# Patient Record
Sex: FEMALE | Race: WHITE | HISPANIC OR LATINO | Employment: UNEMPLOYED | URBAN - METROPOLITAN AREA
[De-identification: names, ages, dates, MRNs, and addresses within clinical notes are randomized per-mention and may not be internally consistent; named-entity substitution may affect disease eponyms.]

---

## 2020-04-08 ENCOUNTER — LAB VISIT (OUTPATIENT)
Dept: URGENT CARE | Facility: CLINIC | Age: 55
End: 2020-04-08
Payer: OTHER GOVERNMENT

## 2020-04-08 ENCOUNTER — OFFICE VISIT (OUTPATIENT)
Dept: URGENT CARE | Facility: CLINIC | Age: 55
End: 2020-04-08
Payer: OTHER GOVERNMENT

## 2020-04-08 VITALS
DIASTOLIC BLOOD PRESSURE: 78 MMHG | HEART RATE: 75 BPM | BODY MASS INDEX: 33.66 KG/M2 | OXYGEN SATURATION: 97 % | HEIGHT: 63 IN | RESPIRATION RATE: 16 BRPM | WEIGHT: 190 LBS | SYSTOLIC BLOOD PRESSURE: 118 MMHG | TEMPERATURE: 98 F

## 2020-04-08 DIAGNOSIS — J18.9 PNEUMONIA OF LEFT LOWER LOBE DUE TO INFECTIOUS ORGANISM: ICD-10-CM

## 2020-04-08 DIAGNOSIS — J18.9 PNEUMONIA OF LEFT LOWER LOBE DUE TO INFECTIOUS ORGANISM: Primary | ICD-10-CM

## 2020-04-08 DIAGNOSIS — R06.02 SOB (SHORTNESS OF BREATH): ICD-10-CM

## 2020-04-08 DIAGNOSIS — F41.9 ANXIETY: ICD-10-CM

## 2020-04-08 DIAGNOSIS — Z20.822 SUSPECTED COVID-19 VIRUS INFECTION: ICD-10-CM

## 2020-04-08 DIAGNOSIS — R00.2 PALPITATIONS: ICD-10-CM

## 2020-04-08 LAB — SARS-COV-2 RNA RESP QL NAA+PROBE: NOT DETECTED

## 2020-04-08 PROCEDURE — 93010 EKG 12-LEAD: ICD-10-PCS | Mod: S$PBB,TIER,, | Performed by: INTERNAL MEDICINE

## 2020-04-08 PROCEDURE — 71046 X-RAY EXAM CHEST 2 VIEWS: CPT | Mod: FY,TIER,S$GLB, | Performed by: RADIOLOGY

## 2020-04-08 PROCEDURE — 93005 EKG 12-LEAD: ICD-10-PCS | Mod: TIER,S$GLB,, | Performed by: PHYSICIAN ASSISTANT

## 2020-04-08 PROCEDURE — 93005 ELECTROCARDIOGRAM TRACING: CPT | Mod: TIER,S$GLB,, | Performed by: PHYSICIAN ASSISTANT

## 2020-04-08 PROCEDURE — U0002 COVID-19 LAB TEST NON-CDC: HCPCS

## 2020-04-08 PROCEDURE — 99204 PR OFFICE/OUTPT VISIT, NEW, LEVL IV, 45-59 MIN: ICD-10-PCS | Mod: TIER,S$GLB,, | Performed by: PHYSICIAN ASSISTANT

## 2020-04-08 PROCEDURE — 71046 XR CHEST PA AND LATERAL: ICD-10-PCS | Mod: FY,TIER,S$GLB, | Performed by: RADIOLOGY

## 2020-04-08 PROCEDURE — 93010 ELECTROCARDIOGRAM REPORT: CPT | Mod: S$PBB,TIER,, | Performed by: INTERNAL MEDICINE

## 2020-04-08 PROCEDURE — 99204 OFFICE O/P NEW MOD 45 MIN: CPT | Mod: TIER,S$GLB,, | Performed by: PHYSICIAN ASSISTANT

## 2020-04-08 RX ORDER — LISINOPRIL 10 MG/1
10 TABLET ORAL DAILY
COMMUNITY

## 2020-04-08 RX ORDER — ALBUTEROL SULFATE 90 UG/1
2 AEROSOL, METERED RESPIRATORY (INHALATION) EVERY 6 HOURS PRN
Qty: 18 G | Refills: 0 | Status: SHIPPED | OUTPATIENT
Start: 2020-04-08

## 2020-04-08 RX ORDER — AZITHROMYCIN 250 MG/1
TABLET, FILM COATED ORAL
Qty: 6 TABLET | Refills: 0 | Status: SHIPPED | OUTPATIENT
Start: 2020-04-08

## 2020-04-08 RX ORDER — HYDROXYZINE PAMOATE 25 MG/1
25 CAPSULE ORAL 4 TIMES DAILY
Qty: 30 CAPSULE | Refills: 0 | Status: SHIPPED | OUTPATIENT
Start: 2020-04-08

## 2020-04-08 NOTE — PATIENT INSTRUCTIONS
Reacción Ante El Estrés [Stress Reaction]  La ansiedad (anxiety) es tracy sensación que todos tenemos cuando creemos que algo holly puede llegar a pasar. Es tracy respuesta normal ante el estrés y, por lo general, sólo causa tracy reacción leve. Cuando la ansiedad se vuelve más severa, es posible que las emociones interfieran con la kia diaria. En algunos casos, usted quizás no se dé cuenta de qué es lo que le provoca la ansiedad.  Aydin tracy reacción de ansiedad, puede sentirse desesperanzado, nervioso, deprimido o irritable. Shaw cuerpo puede mostrar signos de ansiedad de muchas maneras diferentes. Puede sentir la boca seca, temblores, mareos, debilidad, dificultad para respirar, presión en el pecho, dolor de marck, náuseas, diarrea, cansancio, problemas sexuales o para dormir.  Cuidados En [place]:  1) Intente identificar qué cosas de shaw kia le ocasionan estrés (stress). Quizás no arabella obvias. Pueden ser, por ejemplo:  -- Complicaciones diarias que se van acumulando (embotellamientos, citas a las que no pudo asistir, problemas con el automóvil, etc.).  -- Cambios de kia importantes, tanto buenos (la llegada de un bebé, un ascenso en el trabajo) kevin malos (quedarse sin trabajo, perder a un ser querido).  -- Sobrecarga: la sensación de que usted tiene demasiadas responsabilidades y que no puede ocuparse de todas ellas al mismo tiempo.  -- Sentirse abrumado, la sensación de que elyse problemas son mucho más de lo que usted puede llegar a resolver.  2) Observe cómo responde hsaw cuerpo al estrés. Aprenda a oír las señales que le da shaw cuerpo. Ello puede ayudarle a actuar antes de que el estrés se vuelva grave.  3) Cuando le sea posible, intente hacer algo para eliminar la causa que le origina estrés. (Evite las complicaciones, limite la cantidad de cambios que suceden en shaw kia al mismo tiempo y tómese un descanso cuando se sienta abrumado.)  4) Desafortunadamente, hay muchas situaciones estresantes que no se pueden  "evitar. Es necesario que aprenda a MANEJAR MEJOR EL ESTRÉS. Hay varios métodos comprobados que le permitirán reducir la ansiedad. Por ejemplo, cosas simples kevin hacer ejercicio, tener tracy buena nutrición y el descanso adecuado. Asimismo, hay ciertas técnicas que resultan útiles: ejercicios de relajación y respiración, visualización, biofeedback y meditación. Para obtener más información sobre ello, consulte a shaw médico o vaya a la librería de shaw nelida y ngoc los diferentes libros y cintas que hay disponibles sobre ines shobha.  Visita De Control:  Si gerson que no logra reducir shaw ansiedad con medidas de autoayuda, comuníquese con shaw médico o programe tracy andi con un consejero.  Busque Prontamente Atención Médica  si algo de lo siguiente ocurre:  -- Los síntomas empeoran.  -- Dolor en el pecho o dificultades para respirar.  -- Dolor de marck mikael que no se gabbie descansando y tomando algún calmante suave.  -- El corazón late rápido o de manera irregular. Desmayo.  Date Last Reviewed: 9/29/2015  © 2077-7881 [employer]. [address] [place] [address]. Todos los derechos reservados. Esta información no pretende sustituir la atención médica profesional. Sólo shaw médico puede diagnosticar y tratar un problema de sandra.        Palpitaciones Cardíacas [Heart Palpitations]    Las palpitaciones (palpitations) se refieren a la sensación de que el corazón late con rapidez, mikael o de manera irregular. Algunas personas dicen que sienten que "se les va a salir el corazón" o que "el corazón parece saltarles en el pecho" ("pounding", "skipped beats"). Las palpitaciones pueden presentarse en personas con alguna enfermedad del corazón, shandra también pueden darse en personas sanas.  Causas Relacionadas Con El Corazón:   · Arritmia (arrhythmia): un cambio en el ritmo normal del corazón.  · Enfermedad de las válvulas del corazón.  Causas No Relacionadas Con El Corazón:   · Ciertos medicamentos (tales kevin los " inhaladores para tratar el asma [asthma inhalers] y los descongestionantes [decongestants]).  · Algunos suplementos herbáceos, pastillas y bebidas energizantes, y pastillas para adelgazar.  · Las drogas ilegales estimulantes (tales kevin la cocaína [cocaine], el crack, las metanfetaminas [methamphetamine], el PCP).  · La cafeína, el alcohol y el tabaco.  · Algunas afecciones médicas, tales kevin enfermedad de la glándula tiroides (thyroid disease), anemia, ataques de pánico y ansiedad (anxiety/panic disorder).  En ocasiones, no se logra encontrar la causa que las genera.  Cuidados En [place]:  1. Evite el exceso de cafeína, alcohol, tabaco y toda droga estimulante.  2. Coméntele a shaw médico sobre todos los medicamentos recetados, de venta sin receta o medicamentos herbáceos que usted tome.  Programe tracy VISITA DE CONTROL con shaw médico, o según le indique nuestro personal médico.  Obtenga Prontamente Atención Médica  si se presenta cualquiera de lo siguiente con palpitaciones:  · Debilidad, mareo, aturdimiento o desmayo.  · Dolor en el pecho o falta de aire.  · El corazón le late con rapidez (más de 120 latidos por minuto, en descanso).  · Palpitaciones que cabezas más de 20 minutos.  · Debilidad en un brazo o tracy pierna, o en un lado de la juan francisco.  · Dificultades para hablar o teresita.  Date Last Reviewed: 4/27/2016  © [id number] [employer]. [address] [place] [address], [place] [address]. Todos los derechos reservados. Esta información no pretende sustituir la atención médica profesional. Sólo shaw médico puede diagnosticar y tratar un problema de sandra.      ¿Qué es la neumonía?       La neumonía es tracy infección grave de los pulmones generalmente causada por bacterias o virus. Otras causas menos comunes incluyen:  · Hongos  · Productos químicos  · Gases  También puede contraer neumonía después de otras enfermedades, kevin un resfriado, gripe o bronquitis. Las personas que se encuentran en riesgo incluyen la gente  mayor y las personas con problemas crónicos de sandra.  Pulmones sanos  · El aire entra y sale de los pulmones a través de unos conductos llamados vías respiratorias.  · Estos conductos se ramifican en conductos más pequeños, llamados bronquiolos, los cuales terminan en pequeños sacos llamados alvéolos.  · Los vasos sanguíneos que rodean los alvéolos llevan el oxígeno a la giselle. Al mismo tiempo, los alvéolos eliminan el dióxido de carbono (un gas de desecjp) de la giselle para que sea expulsado al espirar el aire.  Cuando tiene neumonía    · En tracy neumonía, los bronquiolos y los alvéolos se llenan de mucosidad y se inflaman.  · Shaw cuerpo probablemente responderá con tos para ayudar a eliminar el líquido.  · El líquido (o moco) que usted tose puede tener un aspecto verdoso o amarillento oscuro.  · El exceso de mucosidad puede provocar falta de aliento.  · La inflamación y la infección pueden producir fiebre.  ¿Cuáles son los síntomas?  Los síntomas de la neumonía pueden ocurrir sin aviso previo. Al principio, javan vez usted piense que tiene un resfriado o la gripe, shandra los síntomas pueden empeorar rápidamente y convertirse en tracy neumomía. Los síntomas de la numonía por bacterias pueden ser distintos a los de la neumonía causada por virus. Entre los síntomas más comunes se pueden encontrar los siguientes:  · Tos muy mikael con moco abbie o amarillo que no mejora o que empeora  · Fiebre y escalofríos  · Náuseas, vómitos o diarrea  · Falta de aliento cuando realiza actividades diarias normales  · Aumento de la frecuencia cardíaca  · Dolor de pecho al respirar o al toser  · Dolor de marck  · Traspiración excesiva y piel arcillosa  Date Last Reviewed: 8/4/2014  © 7997-7977 [employer], [employer]. [address] [place] [address], [place] [address]. Todos los derechos reservados. Esta información no pretende sustituir la atención médica profesional. Sólo shaw médico puede diagnosticar y tratar un problema de  sandra.      Departamento de Sandra y [facility]  Prevenir la propagación del Coronoavirus 2019 en hogares y comunidades residenciales      Pasos preventivos para personas con COVID-19 o que se sospecha que están infectadas (incluidas personas bajo investigación) que no necesitan estar hospitalizadas y personas infectadas con COVID-19 que hayan estado hospitalizadas shandra que se determinan medicamente estables para irse a casa.    Shaw médico y el personal de sanidad pública evaluarán si usted puede ser tratado en casa.   Quédese en casa excepto para obtener cuidados médicos.   Sepárese de otras personas y animales en shaw hogar.   Llame por teléfono antes de ir a teresita a shaw médico.   Póngase tracy mascarilla.   Tápese al toser y estornudar.   Lávese las molly con frecuencia.   Evite compartir objetos personales en shaw hogar.   Limpie todas las superficies a shaw alcance todos los días.    Monitoree elyse síntomas. Consiga ayuda médica si la enfermedad empeora (por ejemplo, dificultad para respirar). Antes de salir a buscar ayuda, llame a shaw proveedor médico.    Si tiene tracy emergencia médica y necesita llamar al 911, notifique al personal que responda a shaw llamada de que usted tiene, o está siendo evaluado para COVID-19. Si es posible, póngase tracy mascarilla antes de que la ayuda sanitaria llegue.   Dejar de hacer aislamiento en casa. Llame a shaw médico para que le asesore sobre si puede dejar de hacer aislamiento en casa.    Precauciones recomendadas para miembros del mismo hogar, compañeros íntimos y cuidadores, fuera del ámbito médico, de un paciente sintomático confirmado positivo para COVID-19 o un paciente que está siendo investigado.  Miembros del mismo hogar, compañeros íntimos y cuidadores, fuera del ámbito médico, pueden massimo estado en contacto con tracy persona con síntomas confirmada positivo para COVID-19 o tracy persona bajo investigación. Personas con contacto cercano deberían monitorizar shaw  sandra; deberían llamar a shaw proveedor médico inmediatamente si desarrollan síntomas que sugieren que puede massimo contraído COVID-19 (por ejemplo, fiebre, tos, falta de aliento).    Personas con contacto cercano deberían, además, seguir las siguientes recomendaciones:   Asegúrese de que usted puede entender y ayudar al paciente a seguir las instrucciones de shaw proveedor médico, en relación con la medicación y el cuidado a seguir. Debería ayudar al paciente con elyse necesidades básicas en casa y ayudar cuando sea necesario a hacer la compra, ir a recoger las recetas médicas y otras necesidades personales.   Monitorear los síntomas del paciente. Si el paciente empeora, llame a shaw proveedor médico y dígale que el paciente nascimento sido confirmado positivo para COVID-19. [place] ayudará a la oficina de shaw médico a venita las medidas adecuadas para evitar que otras personas en la oficina o en la chuy de espera se infecten. Pida al proveedor médico que llame al departamento de sandra del estado para más instrucciones. Si el paciente tiene tracy emergencia médica y tiene que llamar al 911, informe al operador que responda a shaw llamada de que el paciente tiene o está siendo evaluado para COVID-19.   Miembros del mismo hogar deberían quedarse en habitaciones separadas del paciente lo lynne posible. Miembros del mismo hogar deberían utilizar otro dormitorio y cuarto de baño, si fuera posible.    Prohibir la visita de cualquier persona que no necesite estar en la casa.   Miembros del mismo hogar deberían ocuparse de las mascotas de la casa. No cuide de animales o mascotas mientras esté enfermo.   Asegúrese de que las áreas comunes de la casa tienen buena ventilación, kevin aire acondicionado o tracy ventana que se pueda abrir si el clima lo permite.   Lávese las molly frecuentemente. Lávese las molly frecuentemente con jabón y agua jimi al menos 20 segundos o utilice un desinfectante de molly con base de alcohol, que contenga entre  60 y 95% de alcohol. Cubriendo todas las superficies de las molly y frotándolas juntas hasta que se sequen.   Evite tocarse los ojos, la nariz y la boca antes de haberse lavado las molly.   El paciente debería llevar mascarilla cuando esté con otras personas. Si el paciente no se puede poner tracy mascarilla porque, por ejemplo, tiene dificultad para respirar, usted, kevni cuidador, debería ponerse tracy mascarilla cuando esté en la misma habitación que el paciente.   Utilice tracy mascarilla desechable y guantes cuando toque o esté en contacto con la giselle del paciente, elyse heces, elyse fluidos corporales tales kevin saliva, esputo, mucosidad nasal, vómito, orina.   o Tire las mascarillas desechables y los guantes yajaira pues de utilizarlos. No los reutilice.  o Cuando se quite la protección, jessica quítese los guantes. Inmediatamente después lávese las molly con agua y jabón o con un desinfectante de molly con base de alcohol. Después quítese y tire la mascarilla, e inmediatamente después lávese las molly otra vez con agua y jabón o utilice un desinfectante de molly con base de alcohol.   Evite compartir objetos del hogar con el paciente. No debería compartir platos, vasos, tazas, cubiertos, toallas, ropa de cama u otros objetos. Después de que el paciente utilice estos objetos debe lavarlos minuciosamente (ngoc debajo Richard la colada minuciosamente).   Limpie todas las superficies de fácil alcance, kevin las encimeras, las mesas, los pomos de las yariel, los picaportes del baño, el retrete, teléfonos, teclados, tabletas y las mesillas de noche, todos los días. Además, lave cualquier superficie que pueda tener giselle, heces o fluidos corporales.   Utilice los productos de limpieza o las toallitas según indiquen las etiquetas de los mismos. Las etiquetas contienen la información para utilizar estos productos de manera simmons y eficiente, además de las precauciones que debe venita al utilizar dichos productos, tales  kevin el uso de guantes o buena ventilación.   Lave la colada minuciosamente.  o Retire inmediatamente cualquier prenda o ropa de cama que tenga giselle, heces o fluidos corporales.  o Utilice guantes desechables cuando toque objetos sucios y separe los objetos sucios de shaw cuerpo. Lávese las molly (con jabón y agua o con un desinfectante de molly con base de alcohol) inmediatamente después de quitarse los guantes.  o Nubia y siga las instrucciones en las etiquetas de la ropa y el detergente. En general, utilice un detergente corriente siguiendo las instrucciones de la lavadora y séquelo meticulosamente utilizando la temperatura mas jesus recomendada en la etiqueta de la ropa.   Coloque todos los guantes desechables, las mascarillas y otros objetos contaminados en un contenedor reforzado antes de tirarlo junto con otros desechos del hogar. Lávese las molly (con jabón y agua o con un desinfectante de molly con base de alcohol) inmediatamente después de tocar estos objetos. Si las molly están visiblemente sucias se recomienda lavarlas con agua y con jabón.    Consulte cualquier otra pregunta con shaw departamento de sandra local o estatal o con shaw proveedor médico. Compruebe las horas en las que se puede contactar al departamento de sandra local.    Para más información, siga el enlace del CDC que encontrará a continuación.    https://www.cdc.gov/coronavirus/2019-ncov/hcp/guidance-prevent-spread-sp.html     Please follow up with your Primary care provider within 2-5 days if your signs and symptoms have not resolved or worsen.     If your condition worsens or fails to improve we recommend that you receive another evaluation at the emergency room immediately or contact your primary medical clinic to discuss your concerns.   You must understand that you have received an Urgent Care treatment only and that you may be released before all of your medical problems are known or treated. You, the patient, will arrange for follow up  care as instructed.     RED FLAGS/WARNING SYMPTOMS DISCUSSED WITH PATIENT THAT WOULD WARRANT EMERGENT MEDICAL ATTENTION. PATIENT VERBALIZED UNDERSTANDING.

## 2020-04-08 NOTE — PROGRESS NOTES
"Subjective:       Patient ID: Tracy Zhang is a 54 y.o. female.    Vitals:  height is 5' 3" (1.6 m) and weight is 86.2 kg (190 lb). Her temperature is 98.3 °F (36.8 °C). Her blood pressure is 118/78 and her pulse is 75. Her respiration is 16 and oxygen saturation is 97%.     Chief Complaint: Palpitations    Patient states that she just got back from [place] last Tuesday.  She struggled with anxiety and palpitations in the past.  She states she has been extra stressed out since returning here.  She states she also has a son in [place] that she has been concerned about.  She states she is now to the point where she gets really nauseated and will start vomiting.  When triage ring patient started to get slightly emotional began crying because of her concern and worry.    Palpitations    This is a new problem. The current episode started in the past 7 days. The problem occurs intermittently. The problem has been unchanged. Nothing aggravates the symptoms. Associated symptoms include nausea and vomiting. Pertinent negatives include no anxiety, chest fullness, chest pain, coughing, diaphoresis, dizziness, fever, irregular heartbeat, malaise/fatigue, near-syncope, numbness, shortness of breath, syncope or weakness.       Constitution: Negative for chills, sweating, fatigue and fever.   HENT: Negative for congestion and sore throat.    Neck: Negative for painful lymph nodes.   Cardiovascular: Positive for palpitations. Negative for chest pain, leg swelling and passing out.   Eyes: Negative for double vision and blurred vision.   Respiratory: Positive for chest tightness. Negative for cough and shortness of breath.    Gastrointestinal: Positive for nausea and vomiting. Negative for diarrhea.   Genitourinary: Negative for dysuria, frequency, urgency and history of kidney stones.   Musculoskeletal: Negative for joint pain, joint swelling, muscle cramps and muscle ache.   Skin: Negative for color change, pale, rash and " bruising.   Allergic/Immunologic: Negative for seasonal allergies.   Neurological: Negative for dizziness, history of vertigo, light-headedness, passing out, headaches and numbness.   Hematologic/Lymphatic: Negative for swollen lymph nodes.   Psychiatric/Behavioral: Negative for nervous/anxious, sleep disturbance and depression. The patient is not nervous/anxious.        Objective:      Physical Exam   Constitutional: She is oriented to person, place, and time. She appears well-developed and well-nourished. She is cooperative.  Non-toxic appearance. She does not have a sickly appearance. She does not appear ill. No distress.   HENT:   Head: Normocephalic and atraumatic.   Right Ear: Hearing, tympanic membrane, external ear and ear canal normal.   Left Ear: Hearing, tympanic membrane, external ear and ear canal normal.   Nose: Nose normal. No mucosal edema, rhinorrhea or nasal deformity. No epistaxis. Right sinus exhibits no maxillary sinus tenderness and no frontal sinus tenderness. Left sinus exhibits no maxillary sinus tenderness and no frontal sinus tenderness.   Mouth/Throat: Uvula is midline, oropharynx is clear and moist and mucous membranes are normal. No trismus in the jaw. Normal dentition. No uvula swelling. No oropharyngeal exudate, posterior oropharyngeal edema or posterior oropharyngeal erythema.   Eyes: Conjunctivae and lids are normal. Right eye exhibits no discharge. Left eye exhibits no discharge. No scleral icterus.   Neck: Trachea normal, normal range of motion, full passive range of motion without pain and phonation normal. Neck supple. No neck rigidity. No edema and no erythema present.   Cardiovascular: Normal rate, regular rhythm, normal heart sounds, intact distal pulses and normal pulses.   Pulmonary/Chest: Effort normal and breath sounds normal. No respiratory distress. She has no decreased breath sounds. She has no rhonchi.   Abdominal: Soft. Normal appearance and bowel sounds are normal.  She exhibits no distension, no pulsatile midline mass and no mass. There is no tenderness.   Musculoskeletal: Normal range of motion. She exhibits no edema or deformity.   Neurological: She is alert and oriented to person, place, and time. She exhibits normal muscle tone. Coordination normal.   Skin: Skin is warm, dry, intact, not diaphoretic and not pale.   Psychiatric: She has a normal mood and affect. Her speech is normal and behavior is normal. Judgment and thought content normal. Cognition and memory are normal.   Nursing note and vitals reviewed.      EKG: NSR 98bpm; NO ST changes.     X-ray Chest Pa And Lateral    Result Date: 4/8/2020  EXAMINATION: XR CHEST PA AND LATERAL CLINICAL HISTORY: Palpitations TECHNIQUE: PA and lateral views of the chest were performed. COMPARISON: None. FINDINGS: Mediastinal structures are midline.  Hilar contours are unremarkable.  Cardiac silhouette is normal in size.  Lung volumes are low but symmetric.  Subsegmental opacity projects over the left lower lung zone.  No pneumothorax or pleural effusion.  No free air beneath the diaphragm.  No acute osseous abnormalities.  Visualized soft tissues are unremarkable.     1. Subsegmental opacity projecting over the left lower lung zone immediately adjacent to the diaphragmatic surface, possibly atelectasis or developing consolidation.  Follow-up radiograph recommended in 4-6 weeks. Electronically signed by: Toby Serrano MD Date:    04/08/2020 Time:    10:29        Assessment:       1. Pneumonia of left lower lobe due to infectious organism    2. Palpitations    3. SOB (shortness of breath)    4. Anxiety    5. Suspected Covid-19 Virus Infection        Plan:         Pneumonia of left lower lobe due to infectious organism  -     azithromycin (ZITHROMAX Z-BARON) 250 MG tablet; Take 2 tablets (500 mg) on  Day 1,  followed by 1 tablet (250 mg) once daily on Days 2 through 5.  Dispense: 6 tablet; Refill: 0    Palpitations  -     EKG  12-lead  -     X-Ray Chest PA And Lateral; Future; Expected date: 04/08/2020    SOB (shortness of breath)  -     SARS- CoV-2 (COVID-19) QUALITATIVE PCR  -     albuterol (PROVENTIL/VENTOLIN HFA) 90 mcg/actuation inhaler; Inhale 2 puffs into the lungs every 6 (six) hours as needed. Rescue  Dispense: 18 g; Refill: 0    Anxiety  -     hydrOXYzine pamoate (VISTARIL) 25 MG Cap; Take 1 capsule (25 mg total) by mouth 4 (four) times daily.  Dispense: 30 capsule; Refill: 0    Suspected Covid-19 Virus Infection     Reviewed radiographs an EKG with patient.  Discussed that all like her to go get tested for corona virus.  Discussed Coronavirus precautions with patient as well as extensive home care including self isolation and management. Discussed diagnosis with patient as well as treatment and home care. Discussed return to clinic precautions vs ER precautions. All patients questions answered. Patient verbalized understanding. Patient agreed with plan of care.           Reacción Ante El Estrés [Stress Reaction]  La ansiedad (anxiety) es tracy sensación que todos tenemos cuando creemos que algo holly puede llegar a pasar. Es tracy respuesta normal ante el estrés y, por lo general, sólo causa tracy reacción leve. Cuando la ansiedad se vuelve más severa, es posible que las emociones interfieran con la kia diaria. En algunos casos, usted quizás no se dé cuenta de qué es lo que le provoca la ansiedad.  Aydin tracy reacción de ansiedad, puede sentirse desesperanzado, nervioso, deprimido o irritable. Shaw cuerpo puede mostrar signos de ansiedad de muchas maneras diferentes. Puede sentir la boca seca, temblores, mareos, debilidad, dificultad para respirar, presión en el pecho, dolor de marck, náuseas, diarrea, cansancio, problemas sexuales o para dormir.  Cuidados En [place]:  1) Intente identificar qué cosas de shaw kia le ocasionan estrés (stress). Quizás no arabella obvias. Pueden ser, por ejemplo:  -- Complicaciones diarias que se van  acumulando (embotellamientos, citas a las que no pudo asistir, problemas con el automóvil, etc.).  -- Cambios de kia importantes, tanto buenos (la llegada de un bebé, un ascenso en el trabajo) kevin malos (quedarse sin trabajo, perder a un ser querido).  -- Sobrecarga: la sensación de que usted tiene demasiadas responsabilidades y que no puede ocuparse de todas ellas al mismo tiempo.  -- Sentirse abrumado, la sensación de que elyse problemas son mucho más de lo que usted puede llegar a resolver.  2) Observe cómo responde shaw cuerpo al estrés. Aprenda a oír las señales que le da shaw cuerpo. Ello puede ayudarle a actuar antes de que el estrés se vuelva grave.  3) Cuando le sea posible, intente hacer algo para eliminar la causa que le origina estrés. (Evite las complicaciones, limite la cantidad de cambios que suceden en shaw kia al mismo tiempo y tómese un descanso cuando se sienta abrumado.)  4) Desafortunadamente, hay muchas situaciones estresantes que no se pueden evitar. Es necesario que aprenda a MANEJAR MEJOR EL ESTRÉS. Hay varios métodos comprobados que le permitirán reducir la ansiedad. Por ejemplo, cosas simples kevin hacer ejercicio, tener tracy buena nutrición y el descanso adecuado. Asimismo, hay ciertas técnicas que resultan útiles: ejercicios de relajación y respiración, visualización, biofeedback y meditación. Para obtener más información sobre ello, consulte a shaw médico o vaya a la librería de shaw nelida y ngoc los diferentes libros y cintas que hay disponibles sobre ines shobha.  Visita De Control:  Si gerson que no logra reducir shaw ansiedad con medidas de autoayuda, comuníquese con shaw médico o programe tracy andi con un consejero.  Busque Prontamente Atención Médica  si algo de lo siguiente ocurre:  -- Los síntomas empeoran.  -- Dolor en el pecho o dificultades para respirar.  -- Dolor de marck mikael que no se gabbie descansando y tomando algún calmante suave.  -- El corazón late rápido o de manera irregular.  "Edna.  Date Last Reviewed: 9/29/2015  © 4454-9637 [employer]. [address] [place] [address], [place] [address]. Todos los derechos reservados. Esta información no pretende sustituir la atención médica profesional. Sólo shaw médico puede diagnosticar y tratar un problema de sandra.        Palpitaciones Cardíacas [Heart Palpitations]    Las palpitaciones (palpitations) se refieren a la sensación de que el corazón late con rapidez, mikael o de manera irregular. Algunas personas dicen que sienten que "se les va a salir el corazón" o que "el corazón parece saltarles en el pecho" ("pounding", "skipped beats"). Las palpitaciones pueden presentarse en personas con alguna enfermedad del corazón, shandra también pueden darse en personas sanas.  Causas Relacionadas Con El Corazón:   · Arritmia (arrhythmia): un cambio en el ritmo normal del corazón.  · Enfermedad de las válvulas del corazón.  Causas No Relacionadas Con El Corazón:   · Ciertos medicamentos (tales kevin los inhaladores para tratar el asma [asthma inhalers] y los descongestionantes [decongestants]).  · Algunos suplementos herbáceos, pastillas y bebidas energizantes, y pastillas para adelgazar.  · Las drogas ilegales estimulantes (tales kevin la cocaína [cocaine], el crack, las metanfetaminas [methamphetamine], el PCP).  · La cafeína, el alcohol y el tabaco.  · Algunas afecciones médicas, tales kevin enfermedad de la glándula tiroides (thyroid disease), anemia, ataques de pánico y ansiedad (anxiety/panic disorder).  En ocasiones, no se logra encontrar la causa que las genera.  Cuidados En [place]:  1. Evite el exceso de cafeína, alcohol, tabaco y toda droga estimulante.  2. Coméntele a shaw médico sobre todos los medicamentos recetados, de venta sin receta o medicamentos herbáceos que usted tome.  Programe tracy VISITA DE CONTROL con shaw médico, o según le indique nuestro personal médico.  Obtenga Prontamente Atención Médica  si se presenta cualquiera de lo siguiente " con palpitaciones:  · Debilidad, mareo, aturdimiento o desmayo.  · Dolor en el pecho o falta de aire.  · El corazón le late con rapidez (más de 120 latidos por minuto, en descanso).  · Palpitaciones que cabezas más de 20 minutos.  · Debilidad en un brazo o tracy pierna, o en un lado de la juan francisco.  · Dificultades para hablar o teresita.  Date Last Reviewed: 4/27/2016  © 3795-5184 [employer] [address] [place] [id number]. Todos los derechos reservados. Esta información no pretende sustituir la atención médica profesional. Sólo jenkins médico puede diagnosticar y tratar un problema de sandra.      ¿Qué es la neumonía?       La neumonía es tracy infección grave de los pulmones generalmente causada por bacterias o virus. Otras causas menos comunes incluyen:  · Hongos  · Productos químicos  · Gases  También puede contraer neumonía después de otras enfermedades, kevin un resfriado, gripe o bronquitis. Las personas que se encuentran en riesgo incluyen la gente mayor y las personas con problemas crónicos de sandra.  Pulmones sanos  · El aire entra y sale de los pulmones a través de unos conductos llamados vías respiratorias.  · Estos conductos se ramifican en conductos más pequeños, llamados bronquiolos, los cuales terminan en pequeños sacos llamados alvéolos.  · Los vasos sanguíneos que rodean los alvéolos llevan el oxígeno a la giselle. Al mismo tiempo, los alvéolos eliminan el dióxido de carbono (un gas de desecjp) de la giselle para que sea expulsado al espirar el aire.  Cuando tiene neumonía    · En tracy neumonía, los bronquiolos y los alvéolos se llenan de mucosidad y se inflaman.  · Jenkins cuerpo probablemente responderá con tos para ayudar a eliminar el líquido.  · El líquido (o moco) que usted tose puede tener un aspecto verdoso o amarillento oscuro.  · El exceso de mucosidad puede provocar falta de aliento.  · La inflamación y la infección pueden producir fiebre.  ¿Cuáles son los síntomas?  Los síntomas de la  neumonía pueden ocurrir sin aviso previo. Al principio, javan vez usted piense que tiene un resfriado o la gripe, shandra los síntomas pueden empeorar rápidamente y convertirse en tracy neumomía. Los síntomas de la numonía por bacterias pueden ser distintos a los de la neumonía causada por virus. Entre los síntomas más comunes se pueden encontrar los siguientes:  · Tos muy mikael con moco abbie o amarillo que no mejora o que empeora  · Fiebre y escalofríos  · Náuseas, vómitos o diarrea  · Falta de aliento cuando realiza actividades diarias normales  · Aumento de la frecuencia cardíaca  · Dolor de pecho al respirar o al toser  · Dolor de marck  · Traspiración excesiva y piel arcillosa  Date Last Reviewed: 8/4/2014  © 5666-8404 [employer]. [address] [place] [id number]. Todos los derechos reservados. Esta información no pretende sustituir la atención médica profesional. Sólo shaw médico puede diagnosticar y tratar un problema de sandra.      [facility]  Prevenir la propagación del Coronoavirus 2019 en hogares y comunidades residenciales      Pasos preventivos para personas con COVID-19 o que se sospecha que están infectadas (incluidas personas bajo investigación) que no necesitan estar hospitalizadas y personas infectadas con COVID-19 que hayan estado hospitalizadas shandra que se determinan medicamente estables para irse a casa.    Shaw médico y el personal de sanidad pública evaluarán si usted puede ser tratado en casa.   Quédese en casa excepto para obtener cuidados médicos.   Sepárese de otras personas y animales en shaw hogar.   Llame por teléfono antes de ir a teresita a shaw médico.   Póngase tracy mascarilla.   Tápese al toser y estornudar.   Lávese las molly con frecuencia.   Evite compartir objetos personales en shaw hogar.   Limpie todas las superficies a shaw alcance todos los días.    Monitoree elyse síntomas. Consiga ayuda médica si la enfermedad empeora (por  ejemplo, dificultad para respirar). Antes de salir a buscar ayuda, llame a shaw proveedor médico.    Si tiene tracy emergencia médica y necesita llamar al 911, notifique al personal que responda a shaw llamada de que usted tiene, o está siendo evaluado para COVID-19. Si es posible, póngase tracy mascarilla antes de que la ayuda sanitaria llegue.   Dejar de hacer aislamiento en casa. Llame a shaw médico para que le asesore sobre si puede dejar de hacer aislamiento en casa.    Precauciones recomendadas para miembros del mismo hogar, compañeros íntimos y cuidadores, fuera del ámbito médico, de un paciente sintomático confirmado positivo para COVID-19 o un paciente que está siendo investigado.  Miembros del mismo hogar, compañeros íntimos y cuidadores, fuera del ámbito médico, pueden massimo estado en contacto con tracy persona con síntomas confirmada positivo para COVID-19 o tracy persona bajo investigación. Personas con contacto cercano deberían monitorizar shaw sandra; deberían llamar a shaw proveedor médico inmediatamente si desarrollan síntomas que sugieren que puede massimo contraído COVID-19 (por ejemplo, fiebre, tos, falta de aliento).    Personas con contacto cercano deberían, además, seguir las siguientes recomendaciones:   Asegúrese de que usted puede entender y ayudar al paciente a seguir las instrucciones de shaw proveedor médico, en relación con la medicación y el cuidado a seguir. Debería ayudar al paciente con elyse necesidades básicas en casa y ayudar cuando sea necesario a hacer la compra, ir a recoger las recetas médicas y otras necesidades personales.   Monitorear los síntomas del paciente. Si el paciente empeora, llame a shaw proveedor médico y dígale que el paciente nascimento sido confirmado positivo para COVID-19. [place] ayudará a la oficina de shaw médico a venita las medidas adecuadas para evitar que otras personas en la oficina o en la chuy de espera se infecten. Pida al proveedor médico que llame al departamento de sandra del  estado para más instrucciones. Si el paciente tiene tracy emergencia médica y tiene que llamar al 911, informe al operador que responda a shaw llamada de que el paciente tiene o está siendo evaluado para COVID-19.   Miembros del mismo hogar deberían quedarse en habitaciones separadas del paciente lo lynne posible. Miembros del mismo hogar deberían utilizar otro dormitorio y cuarto de baño, si fuera posible.    Prohibir la visita de cualquier persona que no necesite estar en la casa.   Miembros del mismo hogar deberían ocuparse de las mascotas de la casa. No cuide de animales o mascotas mientras esté enfermo.   Asegúrese de que las áreas comunes de la casa tienen buena ventilación, kevin aire acondicionado o tracy ventana que se pueda abrir si el clima lo permite.   Lávese las molly frecuentemente. Lávese las molly frecuentemente con jabón y agua jimi al menos 20 segundos o utilice un desinfectante de molly con base de alcohol, que contenga entre 60 y 95% de alcohol. Cubriendo todas las superficies de las molly y frotándolas juntas hasta que se sequen.   Evite tocarse los ojos, la nariz y la boca antes de haberse lavado las molly.   El paciente debería llevar mascarilla cuando esté con otras personas. Si el paciente no se puede poner tracy mascarilla porque, por ejemplo, tiene dificultad para respirar, usted, kevin cuidador, debería ponerse tracy mascarilla cuando esté en la misma habitación que el paciente.   Utilice tracy mascarilla desechable y guantes cuando toque o esté en contacto con la giselle del paciente, elyse heces, elyse fluidos corporales tales kevin saliva, esputo, mucosidad nasal, vómito, orina.   o Tire las mascarillas desechables y los guantes yajaira pues de utilizarlos. No los reutilice.  o Cuando se quite la protección, jessica quítese los guantes. Inmediatamente después lávese las molly con agua y jabón o con un desinfectante de molly con base de alcohol. Después quítese y tire la mascarilla, e  inmediatamente después lávese las molly otra vez con agua y jabón o utilice un desinfectante de molly con base de alcohol.   Evite compartir objetos del hogar con el paciente. No debería compartir platos, vasos, tazas, cubiertos, toallas, ropa de cama u otros objetos. Después de que el paciente utilice estos objetos debe lavarlos minuciosamente (ngoc debajo Richard la colada minuciosamente).   Limpie todas las superficies de fácil alcance, kevin las encimeras, las mesas, los pomos de las yariel, los picaportes del baño, el retrete, teléfonos, teclados, tabletas y las mesillas de noche, todos los días. Además, lave cualquier superficie que pueda tener giselle, heces o fluidos corporales.   Utilice los productos de limpieza o las toallitas según indiquen las etiquetas de los mismos. Las etiquetas contienen la información para utilizar estos productos de manera simmons y eficiente, además de las precauciones que debe venita al utilizar dichos productos, tales kevin el uso de guantes o buena ventilación.   Lave la colada minuciosamente.  o Retire inmediatamente cualquier prenda o ropa de cama que tenga giselle, heces o fluidos corporales.  o Utilice guantes desechables cuando toque objetos sucios y separe los objetos sucios de shaw cuerpo. Lávese las molly (con jabón y agua o con un desinfectante de molly con base de alcohol) inmediatamente después de quitarse los guantes.  o Nubia y siga las instrucciones en las etiquetas de la ropa y el detergente. En general, utilice un detergente corriente siguiendo las instrucciones de la lavadora y séquelo meticulosamente utilizando la temperatura mas jesus recomendada en la etiqueta de la ropa.   Coloque todos los guantes desechables, las mascarillas y otros objetos contaminados en un contenedor reforzado antes de tirarlo junto con otros desechos del hogar. Lávese las molly (con jabón y agua o con un desinfectante de molly con base de alcohol) inmediatamente después de tocar estos  objetos. Si las molly están visiblemente sucias se recomienda lavarlas con agua y con jabón.    Consulte cualquier otra pregunta con shaw departamento de sandra local o estatal o con shaw proveedor médico. Compruebe las horas en las que se puede contactar al departamento de sandra local.    Para más información, siga el enlace del CDC que encontrará a continuación.    https://www.cdc.gov/coronavirus/2019-ncov/hcp/guidance-prevent-spread-sp.html     Please follow up with your Primary care provider within 2-5 days if your signs and symptoms have not resolved or worsen.     If your condition worsens or fails to improve we recommend that you receive another evaluation at the emergency room immediately or contact your primary medical clinic to discuss your concerns.   You must understand that you have received an Urgent Care treatment only and that you may be released before all of your medical problems are known or treated. You, the patient, will arrange for follow up care as instructed.     RED FLAGS/WARNING SYMPTOMS DISCUSSED WITH PATIENT THAT WOULD WARRANT EMERGENT MEDICAL ATTENTION. PATIENT VERBALIZED UNDERSTANDING.

## 2020-04-09 ENCOUNTER — TELEPHONE (OUTPATIENT)
Dept: URGENT CARE | Facility: CLINIC | Age: 55
End: 2020-04-09

## 2020-04-09 NOTE — TELEPHONE ENCOUNTER
Your test was NEGATIVE for COVID-19 (coronavirus).  If you still have symptoms, treat with rest, fluids, and over-the-counter medications.  Continue to stay home, avoid large crowds, and practice proper handwashing.     If your symptoms worsen or if you have any other concerns, please contact [facility] at [phone].    All patient's questions answered.  She verbalized understanding

## 2020-04-21 ENCOUNTER — OFFICE VISIT (OUTPATIENT)
Dept: URGENT CARE | Facility: CLINIC | Age: 55
End: 2020-04-21
Payer: OTHER GOVERNMENT

## 2020-04-21 VITALS
BODY MASS INDEX: 33.66 KG/M2 | RESPIRATION RATE: 16 BRPM | WEIGHT: 190 LBS | HEART RATE: 82 BPM | HEIGHT: 63 IN | OXYGEN SATURATION: 96 % | TEMPERATURE: 97 F

## 2020-04-21 DIAGNOSIS — R07.1 CHEST PAIN VARYING WITH BREATHING: ICD-10-CM

## 2020-04-21 DIAGNOSIS — R09.1 PLEURISY: ICD-10-CM

## 2020-04-21 DIAGNOSIS — J18.9 PNEUMONIA OF LEFT LOWER LOBE DUE TO INFECTIOUS ORGANISM: Primary | ICD-10-CM

## 2020-04-21 PROCEDURE — 93010 EKG 12-LEAD: ICD-10-PCS | Mod: S$GLB,,, | Performed by: INTERNAL MEDICINE

## 2020-04-21 PROCEDURE — 93005 EKG 12-LEAD: ICD-10-PCS | Mod: S$GLB,,, | Performed by: PHYSICIAN ASSISTANT

## 2020-04-21 PROCEDURE — 71046 X-RAY EXAM CHEST 2 VIEWS: CPT | Mod: FY,TIER,S$GLB, | Performed by: RADIOLOGY

## 2020-04-21 PROCEDURE — 93010 ELECTROCARDIOGRAM REPORT: CPT | Mod: S$GLB,,, | Performed by: INTERNAL MEDICINE

## 2020-04-21 PROCEDURE — 99499 NO LOS: ICD-10-PCS | Mod: S$GLB,,, | Performed by: PHYSICIAN ASSISTANT

## 2020-04-21 PROCEDURE — 71046 XR CHEST PA AND LATERAL: ICD-10-PCS | Mod: FY,TIER,S$GLB, | Performed by: RADIOLOGY

## 2020-04-21 PROCEDURE — 93005 ELECTROCARDIOGRAM TRACING: CPT | Mod: S$GLB,,, | Performed by: PHYSICIAN ASSISTANT

## 2020-04-21 PROCEDURE — 99499 UNLISTED E&M SERVICE: CPT | Mod: S$GLB,,, | Performed by: PHYSICIAN ASSISTANT

## 2020-04-21 RX ORDER — LEVOFLOXACIN 750 MG/1
750 TABLET ORAL DAILY
Qty: 7 TABLET | Refills: 0 | Status: SHIPPED | OUTPATIENT
Start: 2020-04-21 | End: 2020-04-28

## 2020-04-21 NOTE — PROGRESS NOTES
"Subjective:       Patient ID: Tracy Zhang is a 54 y.o. female.    Vitals:  height is 5' 3" (1.6 m) and weight is 86.2 kg (190 lb). Her tympanic temperature is 97.2 °F (36.2 °C). Her pulse is 82. Her respiration is 16 and oxygen saturation is 96%.     Chief Complaint: Abdominal Pain    Ms. Zhang presents for evaluation of pain with deep breaths.  She does not speak English & a  was used for the entire visit (Rian).  She was seen 4/8/2020 & treated for LLL PNA.  She reports cough is nearly resolved & no fevers or chills.  She is having pain at the bottom of her sternum & sometimes in her back when she takes deep breaths.  She denies pain with rest.  She does not have radiating pain to the arm, neck.  She has a history of HTN, but no other cardiac hx.  She does not have pain with overhead movements of her arms.  She did have some nausea this am as well.  She denies any abd pain, reflux symptoms, vomiting or diarrhea.  She has been using the albuterol inhaler that she was prescribed at her last visit without relief.  She has not tried any tylenol or ibuprofen.    Chest Pain    This is a new problem. The current episode started yesterday. The problem occurs intermittently. The problem has been waxing and waning. The pain is present in the epigastric region. The pain is moderate. The quality of the pain is described as sharp. The pain does not radiate. Associated symptoms include a cough. Pertinent negatives include no back pain, diaphoresis, shortness of breath or sputum production.       Constitution: Negative for appetite change, chills and sweating.   HENT: Negative for trouble swallowing.    Cardiovascular: Positive for chest pain.   Respiratory: Positive for chest tightness and cough. Negative for sputum production and shortness of breath.    Gastrointestinal: Negative for abdominal trauma, abdominal bloating, dark colored stools and heartburn.   Genitourinary: Negative for missed menses and " pelvic pain.   Musculoskeletal: Negative for back pain.       Objective:      Physical Exam   Constitutional: She is oriented to person, place, and time. She appears well-developed and well-nourished. She is cooperative.  Non-toxic appearance. She does not appear ill. No distress.   HENT:   Head: Normocephalic and atraumatic.   Right Ear: Hearing, tympanic membrane, external ear and ear canal normal.   Left Ear: Hearing, tympanic membrane, external ear and ear canal normal.   Nose: Nose normal. No mucosal edema, rhinorrhea or nasal deformity. No epistaxis. Right sinus exhibits no maxillary sinus tenderness and no frontal sinus tenderness. Left sinus exhibits no maxillary sinus tenderness and no frontal sinus tenderness.   Mouth/Throat: Uvula is midline, oropharynx is clear and moist and mucous membranes are normal. No trismus in the jaw. Normal dentition. No uvula swelling. No posterior oropharyngeal erythema.   Eyes: Conjunctivae and lids are normal. Right eye exhibits no discharge. Left eye exhibits no discharge. No scleral icterus.   Neck: Trachea normal, normal range of motion, full passive range of motion without pain and phonation normal. Neck supple.   Cardiovascular: Normal rate, regular rhythm, normal heart sounds, intact distal pulses and normal pulses.   No BLE edema.  No Bilat calf tenderness.   Pulmonary/Chest: Effort normal and breath sounds normal. No stridor. No respiratory distress. She has no decreased breath sounds. She has no wheezes. She has no rhonchi. She has no rales. Chest wall is not dull to percussion. She exhibits no mass, no tenderness, no bony tenderness, no laceration, no crepitus, no edema, no deformity, no swelling and no retraction.   No chest wall tenderness.   Abdominal: Soft. Normal appearance and bowel sounds are normal. She exhibits no distension, no pulsatile midline mass and no mass. There is no tenderness.   Musculoskeletal: Normal range of motion. She exhibits no edema or  deformity.   Neurological: She is alert and oriented to person, place, and time. She exhibits normal muscle tone. Coordination normal.   Skin: Skin is warm, dry, intact, not diaphoretic and not pale.   Psychiatric: She has a normal mood and affect. Her speech is normal and behavior is normal. Judgment and thought content normal. Cognition and memory are normal.   Nursing note and vitals reviewed.        CXR - Heart size normal.  Minimal linear opacity at the left lung base as before.  Otherwise the lungs are clear.  No airspace consolidation or pleural effusion identified    EKG - NSR, low voltage QRS    Assessment:       1. Pneumonia of left lower lobe due to infectious organism    2. Chest pain varying with breathing    3. Pleurisy        Plan:         Pneumonia of left lower lobe due to infectious organism    Chest pain varying with breathing  -     XR CHEST PA AND LATERAL; Future; Expected date: 04/21/2020  -     EKG 12-lead    Pleurisy    Other orders  -     levoFLOXacin (LEVAQUIN) 750 MG tablet; Take 1 tablet (750 mg total) by mouth once daily. for 7 days  Dispense: 7 tablet; Refill: 0    CXR with continued opacity on LLL and pleurisy symptoms.  No Hx DVT or PE & no concerning sx on exam.  EKG WNL.  Will treat with levaquin x 7 days.  Patient was given information to establish a PCP, will need follow up CXR in a few weeks.  She verbalized understanding.    Patient Instructions     PLEASE READ YOUR DISCHARGE INSTRUCTIONS ENTIRELY AS IT CONTAINS IMPORTANT INFORMATION.  - Rest.    - Drink plenty of fluids.    - Tylenol or Ibuprofen as directed as needed for fever/pain.    - If you were prescribed antibiotics, please take them to completion.  - If you are female and on birth control pills - please use additional methods of contraception to prevent pregnancy while on antibiotics and for one cycle after.   - If you were prescribed a narcotic medication or muscle relaxer, do not drive or operate heavy equipment or  machinery while taking these medications, as they can cause drowsiness.   - If you smoke, please stop smoking.  -You must understand that you've received an Urgent Care treatment only and that you may be released before all your medical problems are known or treated. You, the patient, will    arrange for follow up care as instructed. Please arrange follow up with your primary medical clinic as soon as possible.   - Follow up with your PCP or specialty clinic as directed in the next 1-2 weeks if not improved or as needed.  You can call [phone] to schedule an appointment with the appropriate provider.    - Please return to Urgent Care or to the Emergency Department if your symptoms worsen.    Patient aware and verbalized understanding.    Pneumonia (Adult)  Pneumonia is an infection deep within the lungs. It is in the small air sacs (alveoli). Pneumonia may be caused by a virus or bacteria. Pneumonia caused by bacteria is usually treated with an antibiotic. Severe cases may need to be treated in the hospital. Milder cases can be treated at home. Symptoms usually start to get better during the first 2 days of treatment.    Home care  Follow these guidelines when caring for yourself at home:  · Rest at home for the first 2 to 3 days, or until you feel stronger. Dont let yourself get overly tired when you go back to your activities.  · Stay away from cigarette smoke - yours or other peoples.  · You may use acetaminophen or ibuprofen to control fever or pain, unless another medicine was prescribed. If you have chronic liver or kidney disease, talk with your healthcare provider before using these medicines. Also talk with your provider if youve had a stomach ulcer or gastrointestinal bleeding. Dont give aspirin to anyone younger than 18 years of age who is ill with a fever. It may cause severe liver damage.  · Your appetite may be poor, so a light diet is fine.  · Drink 6 to 8 glasses of fluids every day to make  sure you are getting enough fluids. Beverages can include water, sport drinks, sodas without caffeine, juices, tea, or soup. Fluids will help loosen secretions in the lung. This will make it easier for you to cough up the phlegm (sputum). If you also have heart or kidney disease, check with your healthcare provider before you drink extra fluids.  · Take antibiotic medicine prescribed until it is all gone, even if you are feeling better after a few days.  Follow-up care  Follow up with your healthcare provider in the next 2 to 3 days, or as advised. This is to be sure the medicine is helping you get better.  If you are 65 or older, you should get a pneumococcal vaccine and a yearly flu (influenza) shot. You should also get these vaccines if you have chronic lung disease like asthma, emphysema, or COPD. Recently, a second type of pneumonia vaccine has become available for everyone over 65 years old. This is in addition to the previous vaccine. Ask your provider about this.  When to seek medical advice  Call your healthcare provider right away if any of these occur:  · You dont get better within the first 48 hours of treatment  · Shortness of breath gets worse  · Rapid breathing (more than 25 breaths per minute)  · Coughing up blood  · Chest pain gets worse with breathing  · Fever of 100.4°F (38°C) or higher that doesnt get better with fever medicine  · Weakness, dizziness, or fainting that gets worse  · Thirst or dry mouth that gets worse  · Sinus pain, headache, or a stiff neck  · Chest pain not caused by coughing  Date Last Reviewed: 1/1/2017  © 8699-7377 [facility]. [address] [place] [address], [place] [address]. All rights reserved. This information is not intended as a substitute for professional medical care. Always follow your healthcare professional's instructions.        Pleurisy  You have pain in your chest. Your healthcare provider has told you that you have pleurisy, or pleuritis. Pleurisy is  swelling (inflammation) of the pleura. The pleura are two layers of thin smooth tissue that surround the lungs and line the chest.  What are the symptoms of pleurisy?     Pleurisy is inflammation of the pleura. The pleura cover the lungs and line the chest.   Pleurisy usually causes sharp chest pain. It is usually worse when you take a deep breath, cough, or sneeze.  What causes pleurisy?  Many things can cause pleurisy. A common cause is a viral infection like the flu or pneumonia. Serious lung problems that can cause it include:  · A blood clot in the lung (pulmonary embolism)  · Air between the pleura (pneumothorax)  Serious heart problems that can cause pleurisy include:  · Heart attack  · Inflammation of the covering of the heart (pericarditis)  How is pleurisy diagnosed?  Your healthcare provider examines you and asks you about your symptoms and health history. He or she will first check you for the serious causes of chest pain. You may have:  · Lab tests  · Imaging tests such as chest X-ray, CT scan, or ultrasound  · EKG  How is pleurisy treated?  Treatment depends on what is causing the pleurisy. Serious conditions are treated in the hospital. You may need medicines to decrease the inflammation and pain.     Call 911  Call 911 if any of these occur:  · Trouble breathing  · Chest pain that gets worse  Call your healthcare provider  Call your healthcare provider right away if you have:  · A fever of 100.4°F (38°C) or higher, or as directed by your healthcare provider   Date Last Reviewed: 11/1/2016  © 3410-3768 [employer]. [address] [place] [address], [place] [address]. All rights reserved. This information is not intended as a substitute for professional medical care. Always follow your healthcare professional's instructions.

## 2020-04-21 NOTE — PROGRESS NOTES
"Subjective:       Patient ID: Tracy Zhang is a 54 y.o. female.    Vitals:  height is 5' 3" (1.6 m) and weight is 86.2 kg (190 lb). Her tympanic temperature is 97.2 °F (36.2 °C). Her pulse is 82. Her respiration is 16 and oxygen saturation is 96%.     Chief Complaint: Abdominal Pain    HPI    Constitution: Negative for appetite change, chills, sweating and fever.   HENT: Negative for trouble swallowing.    Cardiovascular: Negative for chest pain.   Respiratory: Negative for shortness of breath.    Gastrointestinal: Positive for abdominal pain. Negative for abdominal trauma, abdominal bloating, history of abdominal surgery, nausea, vomiting, constipation, diarrhea, dark colored stools and heartburn.   Genitourinary: Negative for dysuria, missed menses and pelvic pain.   Musculoskeletal: Negative for back pain.       Objective:      Physical Exam      Assessment:       No diagnosis found.    Plan:         There are no diagnoses linked to this encounter.       "

## 2020-04-21 NOTE — PATIENT INSTRUCTIONS
PLEASE READ YOUR DISCHARGE INSTRUCTIONS ENTIRELY AS IT CONTAINS IMPORTANT INFORMATION.  - Rest.    - Drink plenty of fluids.    - Tylenol or Ibuprofen as directed as needed for fever/pain.    - If you were prescribed antibiotics, please take them to completion.  - If you are female and on birth control pills - please use additional methods of contraception to prevent pregnancy while on antibiotics and for one cycle after.   - If you were prescribed a narcotic medication or muscle relaxer, do not drive or operate heavy equipment or machinery while taking these medications, as they can cause drowsiness.   - If you smoke, please stop smoking.  -You must understand that you've received an Urgent Care treatment only and that you may be released before all your medical problems are known or treated. You, the patient, will    arrange for follow up care as instructed. Please arrange follow up with your primary medical clinic as soon as possible.   - Follow up with your PCP or specialty clinic as directed in the next 1-2 weeks if not improved or as needed.  You can call [phone] to schedule an appointment with the appropriate provider.    - Please return to Urgent Care or to the Emergency Department if your symptoms worsen.    Patient aware and verbalized understanding.    Pneumonia (Adult)  Pneumonia is an infection deep within the lungs. It is in the small air sacs (alveoli). Pneumonia may be caused by a virus or bacteria. Pneumonia caused by bacteria is usually treated with an antibiotic. Severe cases may need to be treated in the hospital. Milder cases can be treated at home. Symptoms usually start to get better during the first 2 days of treatment.    Home care  Follow these guidelines when caring for yourself at home:  · Rest at home for the first 2 to 3 days, or until you feel stronger. Dont let yourself get overly tired when you go back to your activities.  · Stay away from cigarette smoke - yours or other  peoples.  · You may use acetaminophen or ibuprofen to control fever or pain, unless another medicine was prescribed. If you have chronic liver or kidney disease, talk with your healthcare provider before using these medicines. Also talk with your provider if youve had a stomach ulcer or gastrointestinal bleeding. Dont give aspirin to anyone younger than 18 years of age who is ill with a fever. It may cause severe liver damage.  · Your appetite may be poor, so a light diet is fine.  · Drink 6 to 8 glasses of fluids every day to make sure you are getting enough fluids. Beverages can include water, sport drinks, sodas without caffeine, juices, tea, or soup. Fluids will help loosen secretions in the lung. This will make it easier for you to cough up the phlegm (sputum). If you also have heart or kidney disease, check with your healthcare provider before you drink extra fluids.  · Take antibiotic medicine prescribed until it is all gone, even if you are feeling better after a few days.  Follow-up care  Follow up with your healthcare provider in the next 2 to 3 days, or as advised. This is to be sure the medicine is helping you get better.  If you are 65 or older, you should get a pneumococcal vaccine and a yearly flu (influenza) shot. You should also get these vaccines if you have chronic lung disease like asthma, emphysema, or COPD. Recently, a second type of pneumonia vaccine has become available for everyone over 65 years old. This is in addition to the previous vaccine. Ask your provider about this.  When to seek medical advice  Call your healthcare provider right away if any of these occur:  · You dont get better within the first 48 hours of treatment  · Shortness of breath gets worse  · Rapid breathing (more than 25 breaths per minute)  · Coughing up blood  · Chest pain gets worse with breathing  · Fever of 100.4°F (38°C) or higher that doesnt get better with fever medicine  · Weakness, dizziness, or fainting  that gets worse  · Thirst or dry mouth that gets worse  · Sinus pain, headache, or a stiff neck  · Chest pain not caused by coughing  Date Last Reviewed: 1/1/2017 © 2000-2017 for[employer]]. [address] [place] [address], [place] [address]. All rights reserved. This information is not intended as a substitute for professional medical care. Always follow your healthcare professional's instructions.        Pleurisy  You have pain in your chest. Your healthcare provider has told you that you have pleurisy, or pleuritis. Pleurisy is swelling (inflammation) of the pleura. The pleura are two layers of thin smooth tissue that surround the lungs and line the chest.  What are the symptoms of pleurisy?     Pleurisy is inflammation of the pleura. The pleura cover the lungs and line the chest.   Pleurisy usually causes sharp chest pain. It is usually worse when you take a deep breath, cough, or sneeze.  What causes pleurisy?  Many things can cause pleurisy. A common cause is a viral infection like the flu or pneumonia. Serious lung problems that can cause it include:  · A blood clot in the lung (pulmonary embolism)  · Air between the pleura (pneumothorax)  Serious heart problems that can cause pleurisy include:  · Heart attack  · Inflammation of the covering of the heart (pericarditis)  How is pleurisy diagnosed?  Your healthcare provider examines you and asks you about your symptoms and health history. He or she will first check you for the serious causes of chest pain. You may have:  · Lab tests  · Imaging tests such as chest X-ray, CT scan, or ultrasound  · EKG  How is pleurisy treated?  Treatment depends on what is causing the pleurisy. Serious conditions are treated in the hospital. You may need medicines to decrease the inflammation and pain.     Call 911  Call 911 if any of these occur:  · Trouble breathing  · Chest pain that gets worse  Call your healthcare provider  Call your healthcare provider right away if you  have:  · A fever of 100.4°F (38°C) or higher, or as directed by your healthcare provider   Date Last Reviewed: 11/1/2016 © 2000-2017 [employer]. [address] [place] [address], [place] [address]. All rights reserved. This information is not intended as a substitute for professional medical care. Always follow your healthcare professional's instructions.

## 2020-06-29 ENCOUNTER — LAB VISIT (OUTPATIENT)
Dept: PRIMARY CARE CLINIC | Facility: OTHER | Age: 55
End: 2020-06-29
Attending: INTERNAL MEDICINE

## 2020-06-29 DIAGNOSIS — Z03.818 ENCOUNTER FOR OBSERVATION FOR SUSPECTED EXPOSURE TO OTHER BIOLOGICAL AGENTS RULED OUT: ICD-10-CM

## 2020-06-29 PROCEDURE — U0003 INFECTIOUS AGENT DETECTION BY NUCLEIC ACID (DNA OR RNA); SEVERE ACUTE RESPIRATORY SYNDROME CORONAVIRUS 2 (SARS-COV-2) (CORONAVIRUS DISEASE [COVID-19]), AMPLIFIED PROBE TECHNIQUE, MAKING USE OF HIGH THROUGHPUT TECHNOLOGIES AS DESCRIBED BY CMS-2020-01-R: HCPCS

## 2020-07-02 LAB — SARS-COV-2 RNA RESP QL NAA+PROBE: NORMAL

## 2020-07-14 ENCOUNTER — LAB VISIT (OUTPATIENT)
Dept: PRIMARY CARE CLINIC | Facility: OTHER | Age: 55
End: 2020-07-14
Attending: INTERNAL MEDICINE
Payer: OTHER GOVERNMENT

## 2020-07-14 DIAGNOSIS — Z03.818 ENCOUNTER FOR OBSERVATION FOR SUSPECTED EXPOSURE TO OTHER BIOLOGICAL AGENTS RULED OUT: ICD-10-CM

## 2020-07-14 PROCEDURE — U0003 INFECTIOUS AGENT DETECTION BY NUCLEIC ACID (DNA OR RNA); SEVERE ACUTE RESPIRATORY SYNDROME CORONAVIRUS 2 (SARS-COV-2) (CORONAVIRUS DISEASE [COVID-19]), AMPLIFIED PROBE TECHNIQUE, MAKING USE OF HIGH THROUGHPUT TECHNOLOGIES AS DESCRIBED BY CMS-2020-01-R: HCPCS

## 2020-07-18 LAB — SARS-COV-2 RNA RESP QL NAA+PROBE: NOT DETECTED

## 2021-05-22 ENCOUNTER — IMMUNIZATION (OUTPATIENT)
Dept: PRIMARY CARE CLINIC | Facility: CLINIC | Age: 56
End: 2021-05-22

## 2021-05-22 DIAGNOSIS — Z23 NEED FOR VACCINATION: Primary | ICD-10-CM

## 2021-05-22 PROCEDURE — 91300 COVID-19, MRNA, LNP-S, PF, 30 MCG/0.3 ML DOSE VACCINE: CPT | Mod: S$GLB,,, | Performed by: INTERNAL MEDICINE

## 2021-05-22 PROCEDURE — 0001A COVID-19, MRNA, LNP-S, PF, 30 MCG/0.3 ML DOSE VACCINE: CPT | Mod: CV19,S$GLB,, | Performed by: INTERNAL MEDICINE

## 2021-05-22 PROCEDURE — 0001A COVID-19, MRNA, LNP-S, PF, 30 MCG/0.3 ML DOSE VACCINE: ICD-10-PCS | Mod: CV19,S$GLB,, | Performed by: INTERNAL MEDICINE

## 2021-05-22 PROCEDURE — 91300 COVID-19, MRNA, LNP-S, PF, 30 MCG/0.3 ML DOSE VACCINE: ICD-10-PCS | Mod: S$GLB,,, | Performed by: INTERNAL MEDICINE

## 2021-06-12 ENCOUNTER — IMMUNIZATION (OUTPATIENT)
Dept: PRIMARY CARE CLINIC | Facility: CLINIC | Age: 56
End: 2021-06-12

## 2021-06-12 DIAGNOSIS — Z23 NEED FOR VACCINATION: Primary | ICD-10-CM

## 2021-06-12 PROCEDURE — 91300 COVID-19, MRNA, LNP-S, PF, 30 MCG/0.3 ML DOSE VACCINE: ICD-10-PCS | Mod: S$GLB,,, | Performed by: INTERNAL MEDICINE

## 2021-06-12 PROCEDURE — 0002A COVID-19, MRNA, LNP-S, PF, 30 MCG/0.3 ML DOSE VACCINE: ICD-10-PCS | Mod: CV19,S$GLB,, | Performed by: INTERNAL MEDICINE

## 2021-06-12 PROCEDURE — 91300 COVID-19, MRNA, LNP-S, PF, 30 MCG/0.3 ML DOSE VACCINE: CPT | Mod: S$GLB,,, | Performed by: INTERNAL MEDICINE

## 2021-06-12 PROCEDURE — 0002A COVID-19, MRNA, LNP-S, PF, 30 MCG/0.3 ML DOSE VACCINE: CPT | Mod: CV19,S$GLB,, | Performed by: INTERNAL MEDICINE
